# Patient Record
Sex: FEMALE | Race: BLACK OR AFRICAN AMERICAN | NOT HISPANIC OR LATINO | Employment: UNEMPLOYED | URBAN - NONMETROPOLITAN AREA
[De-identification: names, ages, dates, MRNs, and addresses within clinical notes are randomized per-mention and may not be internally consistent; named-entity substitution may affect disease eponyms.]

---

## 2019-04-15 ENCOUNTER — OFFICE VISIT (OUTPATIENT)
Dept: PODIATRY | Facility: CLINIC | Age: 59
End: 2019-04-15

## 2019-04-15 VITALS — OXYGEN SATURATION: 91 % | BODY MASS INDEX: 47.09 KG/M2 | HEART RATE: 64 BPM | WEIGHT: 293 LBS | HEIGHT: 66 IN

## 2019-04-15 DIAGNOSIS — M79.674 CHRONIC TOE PAIN, BILATERAL: ICD-10-CM

## 2019-04-15 DIAGNOSIS — M79.675 CHRONIC TOE PAIN, BILATERAL: ICD-10-CM

## 2019-04-15 DIAGNOSIS — G89.29 CHRONIC TOE PAIN, BILATERAL: ICD-10-CM

## 2019-04-15 DIAGNOSIS — E11.8 TYPE 2 DIABETES MELLITUS WITH COMPLICATION, WITHOUT LONG-TERM CURRENT USE OF INSULIN (HCC): ICD-10-CM

## 2019-04-15 DIAGNOSIS — B35.1 ONYCHOMYCOSIS: Primary | ICD-10-CM

## 2019-04-15 PROCEDURE — 11721 DEBRIDE NAIL 6 OR MORE: CPT | Performed by: PODIATRIST

## 2019-04-15 RX ORDER — CEPHALEXIN 500 MG/1
CAPSULE ORAL
COMMUNITY
Start: 2019-04-04

## 2019-04-15 RX ORDER — CLONIDINE HYDROCHLORIDE 0.1 MG/1
TABLET ORAL
COMMUNITY
Start: 2019-01-16

## 2019-04-15 RX ORDER — ATORVASTATIN CALCIUM 10 MG/1
TABLET, FILM COATED ORAL
COMMUNITY
Start: 2019-02-25

## 2019-04-15 RX ORDER — ALPRAZOLAM 0.5 MG/1
TABLET ORAL
COMMUNITY
Start: 2019-02-13

## 2019-04-15 RX ORDER — FUROSEMIDE 40 MG/1
TABLET ORAL
COMMUNITY
Start: 2019-04-08

## 2019-04-15 RX ORDER — DULOXETIN HYDROCHLORIDE 60 MG/1
CAPSULE, DELAYED RELEASE ORAL
COMMUNITY
Start: 2019-03-31

## 2019-04-15 RX ORDER — POTASSIUM CHLORIDE 750 MG/1
TABLET, FILM COATED, EXTENDED RELEASE ORAL
COMMUNITY
Start: 2019-01-17

## 2019-04-15 RX ORDER — METOPROLOL SUCCINATE 50 MG/1
TABLET, EXTENDED RELEASE ORAL
COMMUNITY
Start: 2019-02-09

## 2019-04-15 RX ORDER — LISINOPRIL 20 MG/1
TABLET ORAL
COMMUNITY
Start: 2019-04-04

## 2019-04-15 RX ORDER — GABAPENTIN 800 MG/1
TABLET ORAL
COMMUNITY
Start: 2019-03-20

## 2019-04-15 RX ORDER — OXYCODONE HYDROCHLORIDE 10 MG/1
TABLET ORAL
COMMUNITY
Start: 2019-03-20

## 2019-04-15 RX ORDER — HYDROXYZINE HYDROCHLORIDE 25 MG/1
TABLET, FILM COATED ORAL
COMMUNITY
Start: 2019-04-12

## 2019-04-15 RX ORDER — METHOCARBAMOL 750 MG/1
TABLET, FILM COATED ORAL
COMMUNITY
Start: 2019-03-22

## 2019-04-15 NOTE — PROGRESS NOTES
Fabienne Palomares  1960  58 y.o. female   P. Misty - 4/4/2019 (seen by breana that day because misty was on vacation)  BS - 125 per pt    Patient presents today with a request of diabetic foot care.    04/15/2019    Chief Complaint   Patient presents with   • Left Foot - diabetic foot care   • Right Foot - diabetic foot care       History of Present Illness    Fabienne Palomares is a 58 y.o.female who presents to clinic today for diabetic foot care.  Patient states that her toenails are long thick and painful.  She rates the pain as an 8 out of 10.  Pain is aggravated with weightbearing and close toed shoes.  She admits to being a type II diabetic.  She states that her blood sugars are well controlled.  She denies numbness, burning and tingling in her feet.  She denies any injuries to her feet.  She has no other pedal complaints today.      Past Medical History:   Diagnosis Date   • Diabetes mellitus (CMS/[facility])    • Hypertension    • Ingrown toenail          History reviewed. No pertinent surgical history.      Family History   Problem Relation Age of Onset   • Diabetes Mother    • Hypertension Mother    • Diabetes Father    • Hypertension Father    • Cancer Brother    • Thyroid disease Other        No Known Allergies    Social History     Socioeconomic History   • Marital status: Single     Spouse name: Not on file   • Number of children: Not on file   • Years of education: Not on file   • Highest education level: Not on file   Tobacco Use   • Smoking status: Current Every Day Smoker     Types: Cigarettes   • Smokeless tobacco: Never Used   Substance and Sexual Activity   • Alcohol use: No     Frequency: Never   • Drug use: No   • Sexual activity: Defer         Current Outpatient Medications   Medication Sig Dispense Refill   • ALPRAZolam (XANAX) 0.5 MG tablet      • atorvastatin (LIPITOR) 10 MG tablet      • cephalexin (KEFLEX) 500 MG capsule      • CloNIDine (CATAPRES) 0.1 MG tablet      • DULoxetine (CYMBALTA) 60  "MG capsule      • furosemide (LASIX) 40 MG tablet      • gabapentin (NEURONTIN) 800 MG tablet      • hydrOXYzine (ATARAX) 25 MG tablet      • lisinopril (PRINIVIL,ZESTRIL) 20 MG tablet      • metFORMIN (GLUCOPHAGE) 500 MG tablet      • methocarbamol (ROBAXIN) 750 MG tablet      • metoprolol succinate XL (TOPROL-XL) 50 MG 24 hr tablet      • oxyCODONE (ROXICODONE) 10 MG tablet      • potassium chloride (K-DUR) 10 MEQ CR tablet        No current facility-administered medications for this visit.        Review of Systems   Constitutional: Negative.    HENT: Negative.    Eyes: Negative.    Respiratory: Negative.    Cardiovascular: Positive for leg swelling. Negative for chest pain.   Gastrointestinal: Negative.    Musculoskeletal: Positive for arthralgias and back pain.        Right great toe pain   Skin: Positive for color change.   Neurological: Negative.    Psychiatric/Behavioral: Negative.          OBJECTIVE    Pulse 64   Ht 167.6 cm (66\")   Wt (!) 139 kg (307 lb)   SpO2 91%   BMI 49.55 kg/m²       Physical Exam   Constitutional: She is oriented to person, place, and time. She appears well-developed and well-nourished. No distress.   HENT:   Head: Normocephalic and atraumatic.   Nose: Nose normal.   Eyes: Conjunctivae and EOM are normal. Pupils are equal, round, and reactive to light.   Pulmonary/Chest: Effort normal. No respiratory distress. She has no wheezes.    Fabienne had a diabetic foot exam performed today.  Neurological: She is alert and oriented to person, place, and time. She displays normal reflexes.   Skin: Skin is warm and dry. Capillary refill takes less than 2 seconds.   Psychiatric: She has a normal mood and affect. Her behavior is normal.   Vitals reviewed.      Gait: Normal    Assistive Device: None    Lower Extremity    Cardiovascular:    DP/PT pulses palpable bilateral  CFT brisk  to all digits  Skin temp is warm to warm from proximal tibia to distal digits bilateral  Pedal hair growth " present.   Edema noted to bilateral lower extremities    Musculoskeletal:  Muscle strength is 5/5 for all muscle groups tested   ROM of the 1st MTP is WNL bilateral   ROM of the MTJ is WNL bilateral   ROM of the STJ is WNL bilateral   ROM of the ankle joint is  WNL bilateral     Dermatological:   No open wounds noted.  Chronic venous stasis changes noted to lower extremities.  Webspaces 1-4 bilateral are clean, dry and intact.   No subcutaneous nodules or masses noted    Nails 1-5 bilateral thickened, discolored, elongated with subungual debris.  Pain on palpation to the nail plates.    Neurological:   Protective sensation intact   Sensation intact to light touch        Procedures        ASSESSMENT AND PLAN    Fabienne was seen today for diabetic foot care and diabetic foot care.    Diagnoses and all orders for this visit:    Onychomycosis    Chronic toe pain, bilateral    Type 2 diabetes mellitus with complication, without long-term current use of insulin (CMS/[facility])      - A diabetic foot screening exam was performed and the patient was educated on the foot complications related to diabetes,  preventative foot care and what signs and symptoms to watch for.  Instructed to contact our office if any foot problems develop before next visit.   - Nails 1-5 bilateral were debrided in length and thickness with nail nipper and electric  to decrease fungal load and risk of infection.   - recommended compression stockings  - all her questions were answered  - RTC 3 months           This document has been electronically signed by Matt Alexander DPM on April 15, 2019 4:04 PM     4/15/2019  4:04 PM

## 2019-07-18 ENCOUNTER — TRANSCRIBE ORDERS (OUTPATIENT)
Dept: PHYSICAL THERAPY | Facility: HOSPITAL | Age: 59
End: 2019-07-18

## 2019-07-18 DIAGNOSIS — M46.1 SACROILIITIS, NOT ELSEWHERE CLASSIFIED (HCC): ICD-10-CM

## 2019-07-18 DIAGNOSIS — G89.29 OTHER CHRONIC PAIN: ICD-10-CM

## 2019-07-18 DIAGNOSIS — M47.816 SPONDYLOSIS WITHOUT MYELOPATHY OR RADICULOPATHY, LUMBAR REGION: Primary | ICD-10-CM

## 2019-08-08 ENCOUNTER — APPOINTMENT (OUTPATIENT)
Dept: PHYSICAL THERAPY | Facility: HOSPITAL | Age: 59
End: 2019-08-08

## 2019-08-13 ENCOUNTER — HOSPITAL ENCOUNTER (OUTPATIENT)
Dept: PHYSICAL THERAPY | Facility: HOSPITAL | Age: 59
Setting detail: THERAPIES SERIES
Discharge: HOME | End: 2019-08-13

## 2019-08-13 DIAGNOSIS — M47.816 LUMBAR SPONDYLOSIS: Primary | ICD-10-CM

## 2019-08-13 DIAGNOSIS — G89.4 CHRONIC PAIN SYNDROME: ICD-10-CM

## 2019-08-13 DIAGNOSIS — M46.1 SACROILIITIS, NOT ELSEWHERE CLASSIFIED (HCC): ICD-10-CM

## 2019-08-13 PROCEDURE — 97110 THERAPEUTIC EXERCISES: CPT | Performed by: PHYSICAL THERAPIST

## 2019-08-13 PROCEDURE — 97162 PT EVAL MOD COMPLEX 30 MIN: CPT | Performed by: PHYSICAL THERAPIST

## 2019-08-13 NOTE — THERAPY EVALUATION
Outpatient Physical Therapy Ortho Initial Evaluation  [facility]  Gricelda Irvin, PT, DPT, CSCS       Patient Name: Fabienne Palomares  : 1960  MRN: [id number]  Today's Date: 2019      Visit Date: 2019    Pt reports 7/10 pain pre treatment, 7/10 pain post treatment  Reports N/A% of improvement.  Attended  visits.  Insurance available: medicare /20 visists  Next MD appt: [email or web address] .  Recertification: 2019.     Past Medical History:   Diagnosis Date   • Diabetes mellitus (CMS/HCC)    • Hyperlipidemia    • Hypertension    • Ingrown toenail    • Lymphedema    • Mood changes    • Sleep apnea         History reviewed. No pertinent surgical history.    Visit Dx:     ICD-10-CM ICD-9-CM   1. Lumbar spondylosis M47.816 721.3   2. Chronic pain syndrome G89.4 338.4   3. Sacroiliitis, not elsewhere classified (CMS/HCC) M46.1 720.2     Number of days off work: N/A    Patient is single.    Patient has grown children.          Medications      ALPRAZolam (XANAX) 0.5 MG tablet     atorvastatin (LIPITOR) 10 MG tablet     cephalexin (KEFLEX) 500 MG capsule     CloNIDine (CATAPRES) 0.1 MG tablet     DULoxetine (CYMBALTA) 60 MG capsule     furosemide (LASIX) 40 MG tablet     gabapentin (NEURONTIN) 800 MG tablet     hydrOXYzine (ATARAX) 25 MG tablet     lisinopril (PRINIVIL,ZESTRIL) 20 MG tablet     metFORMIN (GLUCOPHAGE) 500 MG tablet     methocarbamol (ROBAXIN) 750 MG tablet     metoprolol succinate XL (TOPROL-XL) 50 MG 24 hr tablet     oxyCODONE (ROXICODONE) 10 MG tablet     potassium chloride (K-DUR) 10 MEQ CR tablet      Allergies: None    Patient History     Row Name 19 0900             History    Chief Complaint  Pain  -AJ      Type of Pain  Back pain  -AJ      Date Current Problem(s) Began  -- Chronic  -AJ      Brief Description of Current Complaint  patient reports she has had LBP for years. She rpeorts it has been worsening over the last few months. She  "reprots now her knees and legs have been bothering her. She reports they think she may have gout. She also reports she was told her has chronic lymphedema in the R LE. She reprots the R LE lymphedema started in 2003 and she had n insurance so she didn't really get much treatment. She reprots after a while they stopped treatment due to no insurance. She reports they never found out why she had it. She reports she had treatment with Dr. Jay's wife for \"a good little while\" but land therapy wasn;t really helping.  -AJ      Previous treatment for THIS PROBLEM  Medication;Pain Management;Rehabilitation  -AJ      Patient/Caregiver Goals  Relieve pain;Know what to do to help the symptoms  -AJ      Current Tobacco Use  ~1pack per week  -AJ      Smoking Status  some day smoker  -AJ      Patient's Rating of General Health  Fair  -AJ      Occupation/sports/leisure activities  Occupation: unemployed; Hobbies: grandkids, watch TV, meditation, spiritual music, games on phone  -AJ      Patient seeing anyone else for problem(s)?  Yes, pain management  -AJ      What clinical tests have you had for this problem?  MRI  -AJ      Results of Clinical Tests  Lumbar spondylosis per MD  -AJ      History of Previous Related Injuries  None to note  -AJ      Are you or can you be pregnant  No  -AJ         Pain     Pain Location  Back  -AJ      Pain at Present  7  -AJ      Pain at Best  5  -AJ      Pain at Worst  10  -AJ      Pain Frequency  Constant/continuous  -AJ      Pain Description  -- \"a pinched nerve\" and \"aggitated\"  -AJ      What Performance Factors Make the Current Problem(s) WORSE?  a lot fo standing or wlaking and long term sitting  -AJ      Tolerance Time- Standing  20-30 minutes  -AJ      Tolerance Time- Sitting  30 minutes  -AJ      Tolerance Time- Walking  20-30 minutes  -AJ      Is your sleep disturbed?  Yes most nights  -AJ      Is medication used to assist with sleep?  Yes  -AJ      What position do you sleep in?  Supine  " -AJ      Difficulties at work?  N/A  -AJ      Difficulties with ADL's?  dressing, grooming, transfers  -AJ      Difficulties with recreational activities?  grandkids  -AJ        User Key  (r) = Recorded By, (t) = Taken By, (c) = Cosigned By    Initials Name Provider Type    Gricelda Riley, PT DPT Physical Therapist          PT Ortho     Row Name 08/13/19 0900       Subjective Comments    Subjective Comments  Patient wishes to ease her pain and get doing more exercise.  -AJ       Precautions and Contraindications    Precautions  lumbar spondylosis  -AJ       Subjective Pain    Able to rate subjective pain?  yes  -AJ    Pre-Treatment Pain Level  7  -AJ    Post-Treatment Pain Level  7  -AJ       Posture/Observations    Alignment Options  Forward head;Cervical lordosis;Thoracic kyphosis;Rounded shoulders;Scoliosis;Lumbar lordosis;Iliac crests;Genu valgus  -AJ    Forward Head  Mild;Increased  -AJ    Cervical Lordosis  Mild;Increased  -AJ    Thoracic Kyphosis  Mild;Moderate;Increased  -AJ    Rounded Shoulders  Bilateral:;Mild;Moderate;Increased  -AJ    Scoliosis  Normal  -AJ    Lumbar lordosis  Mild;Increased  -AJ    Iliac crests  Bilateral:;Normal Pelvis is level, no LLD to note  -AJ    Genu valgus  Bilateral:;Mild;Increased  -AJ    Posture/Observations Comments  No distress, poor overall postural awareness.  -AJ       DTR- Lower Quarter Clearing    Patellar tendon (L2-4)  Bilateral:;2- Normal response  -AJ    Achilles tendon (S1-2)  Bilateral:;Unable to assess patient unable to relax  -AJ       Sensory Screen for Light Touch- Lower Quarter Clearing    L1 (inguinal area)  Bilateral:;Intact  -AJ    L2 (anterior mid thigh)  Bilateral:;Intact  -AJ    L3 (distal anterior thigh)  Bilateral:;Intact  -AJ    L4 (medial lower leg/foot)  Bilateral:;Intact  -AJ    L5 (lateral lower leg/great toe)  Bilateral:;Intact  -AJ       Lumbar/SI Special Tests    Standing Flexion Test (SI Dysfunction)  Bilateral:;Negative  -AJ     Stork Test (SI Dysfunction)  Bilateral:;Negative  -AJ    Trendelenburg Test (Gluteus Medius Weakness)  Bilateral:;Negative  -AJ    Slump Test (Neural Tension)  Bilateral:;Negative  -AJ    Antonia Michael Test (HNP)  Negative  -AJ    SLR (Neural Tension)  Right:;Positive;Left:;Negative  -AJ    SI Compression Test (SI Dysfunction)  Bilateral:;Negative  -AJ    SI Distraction Test (SI Dysfunction)  Bilateral:;Negative  -AJ    HELIO (hip vs. SI Dysfunction)  Bilateral:;Negative  -AJ    FAIR Test (Piriformis Syndrome)  Bilateral:;Negative  -AJ    Sacral Spring Test (SI Dysfunction)  Negative  -AJ       Parul's Signs    Superficial and non-anatomical tenderness  Positive  -AJ    Simulation test  Negative  -AJ    Distraction straight leg raise test (sitting vs supine)  Negative  -AJ    Regional disturbances  Negative  -AJ    Overreaction to examination  Negative  -AJ       Head/Neck/Trunk    Trunk Extension AROM  20°  -AJ    Trunk Flexion AROM  62°  -AJ    Trunk Lt Lateral Flexion AROM  WNL  -AJ    Trunk Rt Lateral Flexion AROM  WNL  -AJ    Trunk Lt Rotation AROM  75% of range  -AJ    Trunk Rt Rotation AROM  75% of range  -AJ       MMT (Manual Muscle Testing)    General MMT Comments  B LE 4+/5, no change in pain  -AJ       Sensation    Sensation WNL?  WFL  -AJ    Light Touch  No apparent deficits  -AJ       Lower Extremity Flexibility    Hamstrings  Bilateral:;Moderately limited  -AJ    Hip Flexors  Bilateral:;Mildly limited  -AJ    LE Other Flexibility  Bilateral:;Mildly limited piriformis  -AJ       Pathomechanics    Spine Pathomechanics  Excessive thoracic kyphosis with forward bend;Limited lumbar flattening with forward bend  -AJ       Transfers    Comment (Transfers)  I with al ltransfers, poor log roll technique.  -AJ       Gait/Stairs Assessment/Training    Comment (Gait/Stairs)  FWB, sloed gait, short stride length, slightly waddling and wider ZANDER.  -AJ      User Key  (r) = Recorded By, (t) = Taken By, (c) = Cosigned By     Initials Name Provider Type    Gricelda Riley, PT DPT Physical Therapist          Therapy Education  Given: HEP, Symptoms/condition management, Pain management, Mobility training, Posture/body mechanics(POC)  Program: New  How Provided: Verbal, Demonstration, Written  Provided to: Patient  Level of Understanding: Verbalized, Demonstrated     PT OP Goals     Row Name 08/13/19 0900          PT Short Term Goals    STG Date to Achieve  09/03/19  -     STG 1  I with HEP and have additoins/changes by next recertification.  -     STG 2  AROm lumbar flexion >= 75°.  -AJ     STG 3  B QS 5/5.  -AJ     STG 4  B DF/PF 5/5.  -AJ     STG 5  patient able to ambulate aquaticaly F/R/L 5 min each with no increase in pain.  -        Long Term Goals    LTG Date to Achieve  09/13/19  -     LTG 1  AROm for lumbar spine all WNL, no increas ein pain.  -     LTG 2  B LE 5/5.  -     LTG 3  Patient able to perform 15 reps of all aquatic ther ex.  -     LTG 4  patient able to perform UE movement ther ex aquatically with good core stability.  -     LTG 5  I with all aquatics.  -     LTG 6  I with progression of aquatics.  -     LTG 7  D/C with a final HEp and free 30 day fitness formula memembership.  -        Time Calculation    PT Goal Re-Cert Due Date  09/03/19  -       User Key  (r) = Recorded By, (t) = Taken By, (c) = Cosigned By    Initials Name Provider Type    Gricelda Riley, PT DPT Physical Therapist         Barriers to Rehab: Include significant or possible arthritic/degenerative changes that have occurred within the spine, The chronicity of this issue, The patient's obesity, The patient's generally deconditioned state.      Safety Issues: None noted.      PT Assessment/Plan     Row Name 08/13/19 0900          PT Assessment    Functional Limitations  Impaired gait;Limitation in home management;Limitations in community activities;Performance in leisure activities;Performance in self-care  "ADL  -AJ     Impairments  Balance;Edema;Endurance;Gait;Impaired flexibility;Range of motion;Posture;Poor body mechanics;Pain;Muscle strength;Locomotion;Joint mobility;Impaired muscle endurance;Impaired muscle length;Impaired muscle power  -     Assessment Comments  Patient has had land PT in the past without good sucess per her report. Does exhibit some functional limitations to make her appropriate for aquatic PT for improved mobility, strength, and to gain endurance.  -     Rehab Potential  Fair  -AJ     Patient/caregiver participated in establishment of treatment plan and goals  Yes  -AJ     Patient would benefit from skilled therapy intervention  Yes  -AJ        PT Plan    PT Frequency  2x/week Pool only  -AJ     Predicted Duration of Therapy Intervention (Therapy Eval)  4-8 visits  -AJ     Planned CPT's?  PT EVAL MOD COMPLELITY: 63410;PT RE-EVAL: 65584;PT THER PROC EA 15 MIN: 17976;PT THER ACT EA 15 MIN: 49982;PT THER SUPP EA 15 MIN  -     Physical Therapy Interventions (Optional Details)  aquatics exercise;gross motor skills;home exercise program;lumbar stabilization;patient/family education;postural re-education;ROM (Range of Motion);strengthening;stretching  -     PT Plan Comments  progress ot an I aquatic program. D/C once achieved.  -       User Key  (r) = Recorded By, (t) = Taken By, (c) = Cosigned By    Initials Name Provider Type    Gricelda Riley, PT DPT Physical Therapist       Other therapeutic activities and/or exercises will be prescribed depending on the patients progress or lack there of.      Exercises     Row Name 08/13/19 0900             Subjective Comments    Subjective Comments  Patient wishes to ease her pain and get doing more exercise.  -         Subjective Pain    Able to rate subjective pain?  yes  -AJ      Pre-Treatment Pain Level  7  -AJ      Post-Treatment Pain Level  7  -         Exercise 1    Exercise Name 1  LTR  -      Reps 1  10  -      Time 1  10\" " hold  -         Exercise 2    Exercise Name 2  Log roll technique  -      Reps 2  2  -AJ        User Key  (r) = Recorded By, (t) = Taken By, (c) = Cosigned By    Initials Name Provider Type     Gricelda Irvin, PT DPT Physical Therapist                        Outcome Measure Options: Modifed Owestry  Modified Oswestry  Modified Oswestry Score/Comments: 22/50 = 44%      Time Calculation:     Start Time: 0936  Stop Time: 1010  Time Calculation (min): 34 min  Total Timed Code Minutes- PT: 9 minute(s)     Therapy Charges for Today     Code Description Service Date Service Provider Modifiers Qty    61040164696 HC PT EVAL MOD COMPLEXITY 2 8/13/2019 Gricelda Irvin, PT DPT GP 1    88063590476 HC PT THER PROC EA 15 MIN 8/13/2019 Gricelda Irvin, PT DPT GP 1    50494000322 HC PT THER SUPP EA 15 MIN 8/13/2019 Gricelda Irvin, PT DPT GP 1          PT G-Codes  Outcome Measure Options: Modifed Owestry  Modified Oswestry Score/Comments: 22/50 = 44%         Gricelda Irvin PT DPT, [facility]  8/13/2019

## 2019-08-16 ENCOUNTER — APPOINTMENT (OUTPATIENT)
Dept: PHYSICAL THERAPY | Facility: HOSPITAL | Age: 59
End: 2019-08-16

## 2019-08-19 ENCOUNTER — APPOINTMENT (OUTPATIENT)
Dept: PHYSICAL THERAPY | Facility: HOSPITAL | Age: 59
End: 2019-08-19

## 2019-08-19 ENCOUNTER — TELEPHONE (OUTPATIENT)
Dept: PHYSICAL THERAPY | Facility: HOSPITAL | Age: 59
End: 2019-08-19

## 2019-08-19 NOTE — TELEPHONE ENCOUNTER
Patient calls to state that she is still having some stomach issues and that they are still running some tests. Wants to cancel all appointments for now and when she is ready she will call back to get on the schedule.

## 2019-08-22 ENCOUNTER — APPOINTMENT (OUTPATIENT)
Dept: PHYSICAL THERAPY | Facility: HOSPITAL | Age: 59
End: 2019-08-22

## 2019-10-17 ENCOUNTER — DOCUMENTATION (OUTPATIENT)
Dept: PHYSICAL THERAPY | Facility: CLINIC | Age: 59
End: 2019-10-17

## 2019-10-17 DIAGNOSIS — G89.4 CHRONIC PAIN SYNDROME: ICD-10-CM

## 2019-10-17 DIAGNOSIS — M47.816 LUMBAR SPONDYLOSIS: Primary | ICD-10-CM

## 2019-10-17 DIAGNOSIS — M46.1 SACROILIITIS, NOT ELSEWHERE CLASSIFIED (HCC): ICD-10-CM

## 2019-10-17 NOTE — PROGRESS NOTES
Discharge Summary  Discharge Summary from Physical Therapy Report      Dates  PT visit: 08-  Number of Visits: 1/3     Discharge Status of Patient: unknown    Goals: Not Met    Discharge Plan: pt did not return    Comments: pt did not return    Date of Discharge 10-        Elise Rehman, PTA  Physical Therapist Assistant

## 2021-08-03 ENCOUNTER — TRANSCRIBE ORDERS (OUTPATIENT)
Dept: PHYSICAL THERAPY | Facility: CLINIC | Age: 61
End: 2021-08-03

## 2021-08-03 DIAGNOSIS — M17.11 UNILATERAL PRIMARY OSTEOARTHRITIS, RIGHT KNEE: Primary | ICD-10-CM

## 2022-05-09 ENCOUNTER — OUTSIDE FACILITY SERVICE (OUTPATIENT)
Dept: FAMILY MEDICINE CLINIC | Facility: CLINIC | Age: 62
End: 2022-05-09

## 2022-05-09 PROCEDURE — 99306 1ST NF CARE HIGH MDM 50: CPT | Performed by: FAMILY MEDICINE

## 2022-05-11 ENCOUNTER — OUTSIDE FACILITY SERVICE (OUTPATIENT)
Dept: FAMILY MEDICINE CLINIC | Facility: CLINIC | Age: 62
End: 2022-05-11

## 2022-05-11 PROCEDURE — 99308 SBSQ NF CARE LOW MDM 20: CPT | Performed by: FAMILY MEDICINE

## 2022-10-06 ENCOUNTER — TRANSCRIBE ORDERS (OUTPATIENT)
Dept: SPEECH THERAPY | Facility: HOSPITAL | Age: 62
End: 2022-10-06

## 2022-10-06 DIAGNOSIS — J44.1 CHRONIC OBSTRUCTIVE PULMONARY DISEASE WITH ACUTE EXACERBATION: Primary | ICD-10-CM

## 2022-10-06 DIAGNOSIS — Z93.0 TRACHEOSTOMY STATUS: ICD-10-CM
